# Patient Record
Sex: FEMALE | Race: WHITE | Employment: STUDENT | URBAN - METROPOLITAN AREA
[De-identification: names, ages, dates, MRNs, and addresses within clinical notes are randomized per-mention and may not be internally consistent; named-entity substitution may affect disease eponyms.]

---
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
## 2025-01-03 ENCOUNTER — IMMUNIZATION (OUTPATIENT)
Dept: FAMILY MEDICINE CLINIC | Facility: CLINIC | Age: 20
End: 2025-01-03
Payer: COMMERCIAL

## 2025-01-03 ENCOUNTER — MED REC SCAN ONLY (OUTPATIENT)
Dept: FAMILY MEDICINE CLINIC | Facility: CLINIC | Age: 20
End: 2025-01-03

## 2025-01-03 DIAGNOSIS — Z23 NEED FOR VACCINATION: Primary | ICD-10-CM

## 2025-01-03 PROCEDURE — 90656 IIV3 VACC NO PRSV 0.5 ML IM: CPT | Performed by: FAMILY MEDICINE

## 2025-01-03 PROCEDURE — 90471 IMMUNIZATION ADMIN: CPT | Performed by: FAMILY MEDICINE

## (undated) NOTE — LETTER
[facility] [address] [address]  Dept: [phone]  Dept Fax: [phone]         April 5, 2024    Patient: Buffy Alamo   YOB: 2005   Date of Visit: 4/5/2024       To Whom It May Concern:    Buffy Alamo was seen and treated in our [facility] department on 4/5/2024. She may return to school on today . May also return to work without restrictions.     If you have any questions or concerns, please don't hesitate to call.      Encounter Provider(s):    Jacinda Padilla June, APRN     9:29 AM  4/5/2024

## (undated) NOTE — LETTER
Patient Name: Alecia Puckett  YOB: 2005          MRN number:  [id number]  Date:  10/2/2020  Referring Physician:  August Cast         INITIAL EVALUATION:    Referring Physician: Dr. Priya Alvarenga  Diagnosis:  Chronic midline low back pain without -Impairment in posture; muscle performance  -Wily Saldivar would benefit from services of the physical therapist to address the above impairments to address current complaint  -Likely to respond to hip abductor; abdominal; extensor training  -Anticipate goal achie